# Patient Record
Sex: FEMALE | Race: WHITE | NOT HISPANIC OR LATINO
[De-identification: names, ages, dates, MRNs, and addresses within clinical notes are randomized per-mention and may not be internally consistent; named-entity substitution may affect disease eponyms.]

---

## 2017-02-17 ENCOUNTER — TRANSCRIPTION ENCOUNTER (OUTPATIENT)
Age: 37
End: 2017-02-17

## 2017-03-29 ENCOUNTER — TRANSCRIPTION ENCOUNTER (OUTPATIENT)
Age: 37
End: 2017-03-29

## 2017-04-15 ENCOUNTER — TRANSCRIPTION ENCOUNTER (OUTPATIENT)
Age: 37
End: 2017-04-15

## 2017-09-18 ENCOUNTER — TRANSCRIPTION ENCOUNTER (OUTPATIENT)
Age: 37
End: 2017-09-18

## 2018-02-26 ENCOUNTER — TRANSCRIPTION ENCOUNTER (OUTPATIENT)
Age: 38
End: 2018-02-26

## 2021-11-22 ENCOUNTER — EMERGENCY (EMERGENCY)
Facility: HOSPITAL | Age: 41
LOS: 1 days | Discharge: HOME | End: 2021-11-22
Attending: EMERGENCY MEDICINE | Admitting: EMERGENCY MEDICINE
Payer: COMMERCIAL

## 2021-11-22 VITALS
TEMPERATURE: 99 F | RESPIRATION RATE: 17 BRPM | SYSTOLIC BLOOD PRESSURE: 110 MMHG | HEART RATE: 85 BPM | OXYGEN SATURATION: 98 % | DIASTOLIC BLOOD PRESSURE: 62 MMHG

## 2021-11-22 VITALS
DIASTOLIC BLOOD PRESSURE: 73 MMHG | SYSTOLIC BLOOD PRESSURE: 119 MMHG | TEMPERATURE: 98 F | HEIGHT: 64 IN | OXYGEN SATURATION: 98 % | HEART RATE: 91 BPM | WEIGHT: 119.93 LBS | RESPIRATION RATE: 18 BRPM

## 2021-11-22 PROCEDURE — 72125 CT NECK SPINE W/O DYE: CPT | Mod: ME

## 2021-11-22 PROCEDURE — G1004: CPT

## 2021-11-22 PROCEDURE — 99284 EMERGENCY DEPT VISIT MOD MDM: CPT | Mod: 25

## 2021-11-22 PROCEDURE — 72125 CT NECK SPINE W/O DYE: CPT | Mod: 26,ME

## 2021-11-22 PROCEDURE — 99284 EMERGENCY DEPT VISIT MOD MDM: CPT

## 2021-11-22 RX ORDER — CYCLOBENZAPRINE HYDROCHLORIDE 10 MG/1
10 TABLET, FILM COATED ORAL ONCE
Refills: 0 | Status: COMPLETED | OUTPATIENT
Start: 2021-11-22 | End: 2021-11-22

## 2021-11-22 RX ORDER — OXYCODONE AND ACETAMINOPHEN 5; 325 MG/1; MG/1
1 TABLET ORAL
Qty: 20 | Refills: 0
Start: 2021-11-22

## 2021-11-22 RX ORDER — CYCLOBENZAPRINE HYDROCHLORIDE 10 MG/1
1 TABLET, FILM COATED ORAL
Qty: 10 | Refills: 0
Start: 2021-11-22

## 2021-11-22 RX ORDER — OXYCODONE AND ACETAMINOPHEN 5; 325 MG/1; MG/1
1 TABLET ORAL ONCE
Refills: 0 | Status: DISCONTINUED | OUTPATIENT
Start: 2021-11-22 | End: 2021-11-22

## 2021-11-22 RX ADMIN — Medication 60 MILLIGRAM(S): at 10:38

## 2021-11-22 RX ADMIN — OXYCODONE AND ACETAMINOPHEN 1 TABLET(S): 5; 325 TABLET ORAL at 10:38

## 2021-11-22 RX ADMIN — OXYCODONE AND ACETAMINOPHEN 1 TABLET(S): 5; 325 TABLET ORAL at 11:25

## 2021-11-22 RX ADMIN — CYCLOBENZAPRINE HYDROCHLORIDE 10 MILLIGRAM(S): 10 TABLET, FILM COATED ORAL at 10:38

## 2021-11-22 NOTE — ED ADULT NURSE NOTE - OBJECTIVE STATEMENT
Patient states she woke up with neck pain about a week ago and today her right thumb is numb and she has intermittent burning pain to the distal portion of her right arm.

## 2021-11-22 NOTE — ED ADULT TRIAGE NOTE - CHIEF COMPLAINT QUOTE
Patient is a 40yo female complaining of right arm pain and burning and numbness times one week Pain started in neck and shoulder. Patient denies chest pain

## 2021-11-22 NOTE — ED PROVIDER NOTE - PHYSICAL EXAMINATION
dec sensation to R lat elbow, dorsal thumb. Nl rest of hand. Nl str equal bl in all isolated movements.   Nl cap refill, 2+ pulses. no edema to arm. Nl axilla. dec sensation to R lat elbow, dorsal thumb. Nl rest of hand. Nl str equal bl in all isolated movements except some slight weakness with extension of L thumb.   Nl cap refill, 2+ pulses. no edema to arm. Nl axilla.

## 2021-11-22 NOTE — CONSULT NOTE ADULT - SUBJECTIVE AND OBJECTIVE BOX
41F with history of back pain, migraine, who presents with right forearm pain and tingling. Patient has long history of low back pain but about a week ago woke up with a pinched nerve on the right side in her neck. About two days ago, patient asked  to crack her back; immediately after, she felt her shoulder and neck pain resolve but felt new pain in her forearm and numbness of the thumb. The pain became so unbearable that she came to the hospital today.    In the ED, patient notes her pain is 10/10 and localized through her forearm. She notes it is burning, stabbing, and shocking. She has numbness over the thumb that she rates as 50% of her normal. Nothing has made the pain better and it comes in waves. Patient denies bowel or bladder symptoms, leg pain, or inability to walk. Patient denies any other numbness, tingling, weakness, or any other orthopaedic complaint.     Exam:   T(C): 36.8 (11-22-21 @ 10:14), Max: 36.8 (11-22-21 @ 10:14)  T(F): 98.2 (11-22-21 @ 10:14), Max: 98.2 (11-22-21 @ 10:14)  HR: 91 (11-22-21 @ 10:14) (91 - 91)  BP: 119/73 (11-22-21 @ 10:14) (119/73 - 119/73)  RR: 18 (11-22-21 @ 10:14) (18 - 18)  SpO2: 98% (11-22-21 @ 10:14) (98% - 98%)    Gen: in pain, in bed.   Spine:  Skin intact. No edema, erythema, or lesions of the skin. No visualized deformity.   TTP in the paraspinal areas in the C-T spine regions with paraspinal muscle stiffness. TTP over the lower lumbar spine in the midline, consistent with patient's baseline.   DP, radial pulses palpable.  No calf tenderness bilaterally.  Compartments soft and compressible.     Motor:                   C5                C6              C7               C8           T1   R            4+/5             4+/5            4+/5            4+/5        4+/5                  L             5/5               5/5             5/5             5/5          5/5                L2             L3             L4               L5            S1  R         5/5           5/5          5/5             5/5           5/5  L          5/5          5/5           5/5             5/5           5/5    Sensory:            C5         C6         C7      C8       T1        (0=absent, 1=impaired, 2=normal, NT=not testable)  R         2            1           2        2         2  L          2            2           2        2         2               L2          L3         L4      L5       S1         (0=absent, 1=impaired, 2=normal, NT=not testable)  R         2            2            2        2        2  L          2            2           2        2         2      Laboratory Results:       Imaging: CT of the cervical spine demonstrating disc bulge at C5-6

## 2021-11-22 NOTE — CONSULT NOTE ADULT - ASSESSMENT
41F with C5-6 disc herniation.     Plan:   WBAT/PT/OT  Pain management PRN  Imaging findings reviewed and discussed with the patient and her .   Steroids, flexeril, pain medication  No acute orthopaedic surgical intervention indicated at this time. This patient is orthopaedically stable for discharge.   Patient to follow up with Dr. Martinez as an outpatient for further evaluation and management.   All of the patient's questions and concerns were answered and addressed.   Will discuss with Dr. Martinez and will advise for any changes to the plan.    41F with C5-6 disc herniation.     Plan:   WBAT/PT/OT  Pain management PRN  Imaging findings reviewed and discussed with the patient and her .   Give flexeril, steroids, pain medication now; discharge with medrol dosepack  No acute orthopaedic surgical intervention indicated at this time. This patient is orthopaedically stable for discharge.   Patient to follow up with Dr. Martinez as an outpatient for further evaluation and management.   All of the patient's questions and concerns were answered and addressed.   Discussed with Dr. Martinez who agrees with the plan.

## 2021-11-22 NOTE — ED ADULT NURSE NOTE - CHIEF COMPLAINT QUOTE
Patient is a 42yo female complaining of right arm pain and burning and numbness times one week Pain started in neck and shoulder. Patient denies chest pain

## 2021-11-22 NOTE — ED PROVIDER NOTE - CARE PROVIDER_API CALL
Damon Martinez (MD)  Orthopaedic Surgery  [facility], [address] [place] [address]  Phone: [phone]  Fax: [phone]  Follow Up Time:     Lina Lee ()  Internal Medicine  [address] [place] [address]  Phone: [phone]  Fax: [phone]  Follow Up Time:

## 2021-11-22 NOTE — ED PROVIDER NOTE - NSFOLLOWUPINSTRUCTIONS_ED_ALL_ED_FT
1) Follow-up with your Primary Medical Doctor or referred doctor. Call today / next business day for prompt follow-up.  2) With neck pain, whether it is a new pain or a chronic pain, it is very important to have close, prompt outpatient follow-up for continued workup, evaluation and definitive diagnosis.  If you develop worsening or persistent pain, any numbness, tingling, weakness of one or both of your arms or legs, any fever, or any changes / difficulty urinating then you will need to see your back doctor immediately or you can return to the emergency room.  Many times your doctor will need to set you up for further imaging / testing such as an MRI.  3) See attached instruction sheets for additional information, including information regarding signs and symptoms to look out for, reasons to seek immediate care and other important instructions.  4) Follow-up with your Orthopedist or Neurosurgeon as soon as possible. If you do not have one then you will need to call the referred doctor as soon as possible (today / next business day) for prompt follow-up for further workup and treatment. See the referred doctor for information.  5) Medrol dose pack as prescribed  6) Flexeril as needed for muscle spasm , no driving  7) PErcocet as needed for pain, no driving

## 2021-11-22 NOTE — ED PROVIDER NOTE - CARE PROVIDERS DIRECT ADDRESSES
,[email or web address]_Unknown,[email or web address]

## 2021-11-22 NOTE — ED PROVIDER NOTE - PATIENT PORTAL LINK FT
You can access the FollowMyHealth Patient Portal offered by [facility] by registering at the following website: [email or web address]. By joining [employer]’s FollowMyHealth portal, you will also be able to view your health information using other applications (apps) compatible with our system.

## 2021-11-22 NOTE — ED PROVIDER NOTE - OBJECTIVE STATEMENT
40 yo F p/w woke up with neck pain on R side of neck 3 days ago. Pt had her  "crack her neck / back" and afterwards, having less pain in neck, but pain with slight numbness / tinging in R arm. No fever/chills. no fall/ direct trauma. no fever/chills. no HA/n/v/dizzy. no visual changes. no cough / uri. pt fully vaccinated for covid, no recent exposures. No focal weakness. no agg/allev factors. no other acute co.

## 2021-11-22 NOTE — ED PROVIDER NOTE - PROGRESS NOTE DETAILS
Pt seen by Ortho res, in conj with Dr Martinez- NO MRI needed at this time, can dc home with medrol, outpt fu with Juan for further mri / further mckeon / rx.  Pt wishes to go home now, moderate improvement.   Discussed with patient about the nature of the neck pain and the importance of outpatient follow-up for continued workup, evaluation and definitive diagnosis.  Discussed neck pain and neurologic precautions including numbness, tingling, weakness, fever, and changes in bowel/bladder.  An opportunity to ask questions was given . Understanding of all instructions and precautions was verbalized and the patient will follow-up as outpatient as soon as possible. Patient also understands the possibility of needing additional imaging, ie MRI as outpatient. Patient will return with any changes, concerns, or any worsening / persistent symptoms.

## 2022-02-17 ENCOUNTER — TRANSCRIPTION ENCOUNTER (OUTPATIENT)
Age: 42
End: 2022-02-17

## 2022-03-08 ENCOUNTER — TRANSCRIPTION ENCOUNTER (OUTPATIENT)
Age: 42
End: 2022-03-08

## 2022-05-02 ENCOUNTER — EMERGENCY (EMERGENCY)
Facility: HOSPITAL | Age: 42
LOS: 1 days | Discharge: HOME | End: 2022-05-02
Attending: STUDENT IN AN ORGANIZED HEALTH CARE EDUCATION/TRAINING PROGRAM | Admitting: STUDENT IN AN ORGANIZED HEALTH CARE EDUCATION/TRAINING PROGRAM
Payer: COMMERCIAL

## 2022-05-02 VITALS
HEIGHT: 64 IN | DIASTOLIC BLOOD PRESSURE: 84 MMHG | RESPIRATION RATE: 20 BRPM | HEART RATE: 110 BPM | SYSTOLIC BLOOD PRESSURE: 131 MMHG | OXYGEN SATURATION: 97 % | TEMPERATURE: 98 F

## 2022-05-02 PROCEDURE — 72125 CT NECK SPINE W/O DYE: CPT | Mod: 26,MA

## 2022-05-02 PROCEDURE — 99284 EMERGENCY DEPT VISIT MOD MDM: CPT

## 2022-05-02 PROCEDURE — 99284 EMERGENCY DEPT VISIT MOD MDM: CPT | Mod: 25

## 2022-05-02 PROCEDURE — 72125 CT NECK SPINE W/O DYE: CPT | Mod: MA

## 2022-05-02 RX ORDER — OXYCODONE AND ACETAMINOPHEN 5; 325 MG/1; MG/1
1 TABLET ORAL ONCE
Refills: 0 | Status: DISCONTINUED | OUTPATIENT
Start: 2022-05-02 | End: 2022-05-02

## 2022-05-02 RX ORDER — GABAPENTIN 400 MG/1
1 CAPSULE ORAL
Qty: 21 | Refills: 0
Start: 2022-05-02 | End: 2022-05-08

## 2022-05-02 RX ORDER — GABAPENTIN 400 MG/1
300 CAPSULE ORAL ONCE
Refills: 0 | Status: COMPLETED | OUTPATIENT
Start: 2022-05-02 | End: 2022-05-02

## 2022-05-02 RX ORDER — LIDOCAINE 4 G/100G
1 CREAM TOPICAL ONCE
Refills: 0 | Status: COMPLETED | OUTPATIENT
Start: 2022-05-02 | End: 2022-05-02

## 2022-05-02 RX ORDER — GABAPENTIN 400 MG/1
1 CAPSULE ORAL
Qty: 42 | Refills: 0
Start: 2022-05-02 | End: 2022-05-15

## 2022-05-02 RX ADMIN — GABAPENTIN 300 MILLIGRAM(S): 400 CAPSULE ORAL at 15:51

## 2022-05-02 RX ADMIN — OXYCODONE AND ACETAMINOPHEN 1 TABLET(S): 5; 325 TABLET ORAL at 15:51

## 2022-05-02 RX ADMIN — LIDOCAINE 1 PATCH: 4 CREAM TOPICAL at 15:51

## 2022-05-02 NOTE — ED PROVIDER NOTE - NS ED ROS FT
Constitutional: No reported recent fever.  Neurological: No reported acute headache.  Eyes: No reported new vision changes.   Ears, Nose, Mouth, Throat: No reported acute sore throat.  Cardiovascular: No reported current chest pain.  Respiratory: No reported new shortness of breath.  Gastrointestinal: No reported vomiting.  Genitourinary: No reported new urinary problems.  Musculoskeletal: + extremity pain.  Integumentary (skin and/or breast): No reported new rash.

## 2022-05-02 NOTE — ED PROVIDER NOTE - PATIENT PORTAL LINK FT
You can access the FollowMyHealth Patient Portal offered by [facility] by registering at the following website: [email or web address]. By joining [employer]’s FollowMyHealth portal, you will also be able to view your health information using other applications (apps) compatible with our system.

## 2022-05-02 NOTE — ED ADULT TRIAGE NOTE - CHIEF COMPLAINT QUOTE
c/o b/l arm/hand pain worse on right arm /hand  started few weeks ago ,off and on and got worse for the past 3 days

## 2022-05-02 NOTE — ED PROVIDER NOTE - CLINICAL SUMMARY MEDICAL DECISION MAKING FREE TEXT BOX
Here with intermittent arm pain and weakness, likely from neck pathology. will treat pain and get CT of neck.

## 2022-05-02 NOTE — ED PROVIDER NOTE - PHYSICAL EXAMINATION
in prog Vital signs as available reviewed.  General:  No acute distress.  Head:  Normocephalic, atraumatic.  Eyes:  Conjunctiva pink, no icterus.  Cardiovascular:  Regular rate, no obvious murmur.  Respiratory:  Clear to auscultation, good air entry bilaterally.  Abdomen:  Soft, non-tender.  Musculoskeletal: sensation intact. patient with decreased  strength in right hand. + spurling test.   Neurologic: Alert and oriented, moving all extremities.  Skin:  Warm and dry.

## 2022-05-02 NOTE — ED ADULT NURSE NOTE - OBJECTIVE STATEMENT
Pt received in bed alert and oriented and resting in bed with the c.o b/l arm/hand pain worse on right arm /hand  started few weeks ago since she has been moving from one house to another. Pt states that it is on and off, and now it has gotten worse for the past 3 days. As per MD's orders meds given and tolerated well. Nursing care ongoing safety maintained.

## 2022-05-02 NOTE — ED PROVIDER NOTE - CARE PROVIDER_API CALL
Damon Martinez)  Orthopaedic Surgery  [address] [place] [address], [address] [place] [address]  Phone: [phone]  Fax: [phone]  Follow Up Time: 1-3 Days

## 2022-05-02 NOTE — ED PROVIDER NOTE - PROGRESS NOTE DETAILS
[place], patient with multiple short term prescriptions for oxycodone from Dr. Jenae Santoyo including 4/29 for 10 days. 4/19 x 10 days 4/10 x 10 days. [place]  cons[place]ed, patient with multiple short term prescriptions for oxycodone from Dr. Jenae Santoyo including 4/29 for 10 days. 4/19 x 10 days 4/10 x 10 days etc.

## 2022-05-02 NOTE — ED ADULT NURSE NOTE - CAS ELECT INFOMATION PROVIDED
DC instructions Terbinafine Pregnancy And Lactation Text: This medication is Pregnancy Category B and is considered safe during pregnancy. It is also excreted in breast milk and breast feeding isn't recommended.

## 2022-05-02 NOTE — ED PROVIDER NOTE - OBJECTIVE STATEMENT
42 F history of herniated discs, here with fiance complaining of arm pain. pain has been intermittent for the past few month at least. no new trauma or injury. patient not taking anything for pain at home. patient reports pain causes her to be unable to move hand. has seen an orthopedist in the past but does not remember their name. no PMD.

## 2022-05-02 NOTE — ED PROVIDER NOTE - NSFOLLOWUPINSTRUCTIONS_ED_ALL_ED_FT
Please follow up at the clinic below and with the orthopedist listed below.  Please take your medications as prescribed.    Cervical Radiculopathy       Cervical radiculopathy means that a nerve in the neck (a cervical nerve) is pinched or bruised. This can happen because of an injury to the cervical spine (vertebrae) in the neck, or as a normal part of getting older. This can cause pain or loss of feeling (numbness) that runs from your neck all the way down to your arm and fingers. Often, this condition gets better with rest. Treatment may be needed if the condition does not get better.      What are the causes?    •A neck injury.      •A bulging disk in your spine.      •Muscle movements that you cannot control (muscle spasms).      •Tight muscles in your neck due to overuse.      •Arthritis.      •Breakdown in the bones and joints of the spine (spondylosis) due to getting older.      •Bone spurs that form near the nerves in the neck.        What are the signs or symptoms?  •Pain. The pain may:  •Run from the neck to the arm and hand.      •Be very bad or irritating.      •Be worse when you move your neck.        •Loss of feeling or tingling in your arm or hand.      •Weakness in your arm or hand, in very bad cases.        How is this treated?    In many cases, treatment is not needed for this condition. With rest, the condition often gets better over time. If treatment is needed, options may include:  •Wearing a soft neck collar (cervical collar) for short periods of time, as told by your doctor.      •Doing exercises (physical therapy) to strengthen your neck muscles.      •Taking medicines.      •Having shots (injections) in your spine, in very bad cases.      •Having surgery. This may be needed if other treatments do not help. The type of surgery that is used depends on the cause of your condition.        Follow these instructions at home:    If you have a soft neck collar:     •Wear it as told by your doctor. Remove it only as told by your doctor.    •Ask your doctor if you can remove the collar for cleaning and bathing. If you are allowed to remove the collar for cleaning or bathing:  •Follow instructions from your doctor about how to remove the collar safely.      •Clean the collar by wiping it with mild soap and water and drying it completely.      •Take out any removable pads in the collar every 1–2 days. Wash them by hand with soap and water. Let them air-dry completely before you put them back in the collar.      •Check your skin under the collar for redness or sores. If you see any, tell your doctor.          Managing pain                   •Take over-the-counter and prescription medicines only as told by your doctor.    •If told, put ice on the painful area.  •If you have a soft neck collar, remove it as told by your doctor.      •Put ice in a plastic bag.      •Place a towel between your skin and the bag.      •Leave the ice on for 20 minutes, 2–3 times a day.      •If using ice does not help, you can try using heat. Use the heat source that your doctor recommends, such as a moist heat pack or a heating pad.  •Place a towel between your skin and the heat source.      •Leave the heat on for 20–30 minutes.      •Remove the heat if your skin turns bright red. This is very important if you are unable to feel pain, heat, or cold. You may have a greater risk of getting burned.        •You may try a gentle neck and shoulder rub (massage).      Activity     •Rest as needed.      •Return to your normal activities as told by your doctor. Ask your doctor what activities are safe for you.      •Do exercises as told by your doctor or physical therapist.      • Do not lift anything that is heavier than 10 lb (4.5 kg) until your doctor tells you that it is safe.      General instructions     •Use a flat pillow when you sleep.      • Do not drive while wearing a soft neck collar. If you do not have a soft neck collar, ask your doctor if it is safe to drive while your neck heals.      •Ask your doctor if the medicine prescribed to you requires you to avoid driving or using heavy machinery.      • Do not use any products that contain nicotine or tobacco, such as cigarettes, e-cigarettes, and chewing tobacco. These can delay healing. If you need help quitting, ask your doctor.      •Keep all follow-up visits as told by your doctor. This is important.        Contact a doctor if:    •Your condition does not get better with treatment.        Get help right away if:    •Your pain gets worse and is not helped with medicine.      •You lose feeling or feel weak in your hand, arm, face, or leg.      •You have a high fever.      •You have a stiff neck.      •You cannot control when you poop or pee (have incontinence).      •You have trouble with walking, balance, or talking.        Summary    •Cervical radiculopathy means that a nerve in the neck is pinched or bruised.      •A nerve can get pinched from a bulging disk, arthritis, an injury to the neck, or other causes.      •Symptoms include pain, tingling, or loss of feeling that goes from the neck into the arm or hand.      •Weakness in your arm or hand can happen in very bad cases.      •Treatment may include resting, wearing a soft neck collar, and doing exercises. You might need to take medicines for pain. In very bad cases, shots or surgery may be needed.      This information is not intended to replace advice given to you by your health care provider. Make sure you discuss any questions you have with your health care provider. Your CT scan showed some disc bulging, which was seen before.   Please follow up at the clinic below and with the orthopedist listed below.  Please take your medications as prescribed.    Cervical Radiculopathy       Cervical radiculopathy means that a nerve in the neck (a cervical nerve) is pinched or bruised. This can happen because of an injury to the cervical spine (vertebrae) in the neck, or as a normal part of getting older. This can cause pain or loss of feeling (numbness) that runs from your neck all the way down to your arm and fingers. Often, this condition gets better with rest. Treatment may be needed if the condition does not get better.      What are the causes?    •A neck injury.      •A bulging disk in your spine.      •Muscle movements that you cannot control (muscle spasms).      •Tight muscles in your neck due to overuse.      •Arthritis.      •Breakdown in the bones and joints of the spine (spondylosis) due to getting older.      •Bone spurs that form near the nerves in the neck.        What are the signs or symptoms?  •Pain. The pain may:  •Run from the neck to the arm and hand.      •Be very bad or irritating.      •Be worse when you move your neck.        •Loss of feeling or tingling in your arm or hand.      •Weakness in your arm or hand, in very bad cases.        How is this treated?    In many cases, treatment is not needed for this condition. With rest, the condition often gets better over time. If treatment is needed, options may include:  •Wearing a soft neck collar (cervical collar) for short periods of time, as told by your doctor.      •Doing exercises (physical therapy) to strengthen your neck muscles.      •Taking medicines.      •Having shots (injections) in your spine, in very bad cases.      •Having surgery. This may be needed if other treatments do not help. The type of surgery that is used depends on the cause of your condition.        Follow these instructions at home:    If you have a soft neck collar:     •Wear it as told by your doctor. Remove it only as told by your doctor.    •Ask your doctor if you can remove the collar for cleaning and bathing. If you are allowed to remove the collar for cleaning or bathing:  •Follow instructions from your doctor about how to remove the collar safely.      •Clean the collar by wiping it with mild soap and water and drying it completely.      •Take out any removable pads in the collar every 1–2 days. Wash them by hand with soap and water. Let them air-dry completely before you put them back in the collar.      •Check your skin under the collar for redness or sores. If you see any, tell your doctor.          Managing pain                   •Take over-the-counter and prescription medicines only as told by your doctor.    •If told, put ice on the painful area.  •If you have a soft neck collar, remove it as told by your doctor.      •Put ice in a plastic bag.      •Place a towel between your skin and the bag.      •Leave the ice on for 20 minutes, 2–3 times a day.      •If using ice does not help, you can try using heat. Use the heat source that your doctor recommends, such as a moist heat pack or a heating pad.  •Place a towel between your skin and the heat source.      •Leave the heat on for 20–30 minutes.      •Remove the heat if your skin turns bright red. This is very important if you are unable to feel pain, heat, or cold. You may have a greater risk of getting burned.        •You may try a gentle neck and shoulder rub (massage).      Activity     •Rest as needed.      •Return to your normal activities as told by your doctor. Ask your doctor what activities are safe for you.      •Do exercises as told by your doctor or physical therapist.      • Do not lift anything that is heavier than 10 lb (4.5 kg) until your doctor tells you that it is safe.      General instructions     •Use a flat pillow when you sleep.      • Do not drive while wearing a soft neck collar. If you do not have a soft neck collar, ask your doctor if it is safe to drive while your neck heals.      •Ask your doctor if the medicine prescribed to you requires you to avoid driving or using heavy machinery.      • Do not use any products that contain nicotine or tobacco, such as cigarettes, e-cigarettes, and chewing tobacco. These can delay healing. If you need help quitting, ask your doctor.      •Keep all follow-up visits as told by your doctor. This is important.        Contact a doctor if:    •Your condition does not get better with treatment.        Get help right away if:    •Your pain gets worse and is not helped with medicine.      •You lose feeling or feel weak in your hand, arm, face, or leg.      •You have a high fever.      •You have a stiff neck.      •You cannot control when you poop or pee (have incontinence).      •You have trouble with walking, balance, or talking.        Summary    •Cervical radiculopathy means that a nerve in the neck is pinched or bruised.      •A nerve can get pinched from a bulging disk, arthritis, an injury to the neck, or other causes.      •Symptoms include pain, tingling, or loss of feeling that goes from the neck into the arm or hand.      •Weakness in your arm or hand can happen in very bad cases.      •Treatment may include resting, wearing a soft neck collar, and doing exercises. You might need to take medicines for pain. In very bad cases, shots or surgery may be needed.      This information is not intended to replace advice given to you by your health care provider. Make sure you discuss any questions you have with your health care provider.

## 2023-07-26 ENCOUNTER — APPOINTMENT (OUTPATIENT)
Dept: ORTHOPEDIC SURGERY | Facility: CLINIC | Age: 43
End: 2023-07-26
Payer: COMMERCIAL

## 2023-07-26 VITALS — WEIGHT: 125 LBS | HEIGHT: 63 IN | BODY MASS INDEX: 22.15 KG/M2

## 2023-07-26 DIAGNOSIS — M54.12 RADICULOPATHY, CERVICAL REGION: ICD-10-CM

## 2023-07-26 PROCEDURE — 99203 OFFICE O/P NEW LOW 30 MIN: CPT

## 2023-07-26 PROCEDURE — 99213 OFFICE O/P EST LOW 20 MIN: CPT

## 2023-07-26 PROCEDURE — 73110 X-RAY EXAM OF WRIST: CPT | Mod: RT

## 2023-07-26 RX ORDER — GABAPENTIN 100 MG/1
100 CAPSULE ORAL 3 TIMES DAILY
Qty: 90 | Refills: 0 | Status: ACTIVE | COMMUNITY
Start: 2023-07-26 | End: 1900-01-01

## 2023-07-27 ENCOUNTER — APPOINTMENT (OUTPATIENT)
Dept: MRI IMAGING | Facility: CLINIC | Age: 43
End: 2023-07-27
Payer: COMMERCIAL

## 2023-07-27 PROCEDURE — 72141 MRI NECK SPINE W/O DYE: CPT

## 2023-08-02 ENCOUNTER — APPOINTMENT (OUTPATIENT)
Dept: NEUROLOGY | Facility: CLINIC | Age: 43
End: 2023-08-02
Payer: COMMERCIAL

## 2023-08-02 PROCEDURE — 95886 MUSC TEST DONE W/N TEST COMP: CPT | Mod: 26

## 2023-08-02 PROCEDURE — 95911 NRV CNDJ TEST 9-10 STUDIES: CPT | Mod: 26

## 2023-08-09 ENCOUNTER — APPOINTMENT (OUTPATIENT)
Dept: ORTHOPEDIC SURGERY | Facility: CLINIC | Age: 43
End: 2023-08-09
Payer: COMMERCIAL

## 2023-08-09 DIAGNOSIS — M21.331 WRIST DROP, RIGHT WRIST: ICD-10-CM

## 2023-08-09 PROCEDURE — 99213 OFFICE O/P EST LOW 20 MIN: CPT

## 2023-08-09 RX ORDER — GABAPENTIN 300 MG/1
300 CAPSULE ORAL
Qty: 30 | Refills: 0 | Status: ACTIVE | COMMUNITY
Start: 2023-08-09 | End: 1900-01-01

## 2023-08-09 NOTE — PHYSICAL EXAM
[Rotation to right] : rotation to right [] : light touch intact throughout both upper extremities [de-identified] : 0/5 left wrist extension stregthen, wrist drop - 5/5 remainder

## 2023-08-09 NOTE — DISCUSSION/SUMMARY
[de-identified] : Reviewed the nature of these findings with the patient in layman's terms Briefly discussed potential treatment options MRI cervical spine NCS/ EMG Continue wrist brace Patient instructed on finger and wrist range of motion exercises Gabapentin prescribed for nighttime, patient cautioned on potential for drowsiness Follow-up after studies

## 2023-08-09 NOTE — DISCUSSION/SUMMARY
[de-identified] : Reviewed the nature of these findings with the patient in layman's terms Discussed potential treatment options Continue wrist brace Patient instructed on finger and wrist range of motion exercises Gabapentin prescribed for nighttime, patient cautioned on potential for drowsiness Follow-up in 1 month

## 2023-08-09 NOTE — DATA REVIEWED
[FreeTextEntry1] : MRI of the cervical spine reviewed. EMG: Moderate right carpal tunnel syndrome.  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

## 2023-08-09 NOTE — HISTORY OF PRESENT ILLNESS
[10] : 10 [9] : 9 [de-identified] : Patient returns for repeat evaluation.  She has an MRI and EMG available for review.  She continues to have mild sensory changes in the dorsum of the forearm.  07/26/2023- 43 year F is here for Location: Right wrist Complaint: Patient reports the sudden onset of a right wrist drop beginning on 7/16/2023. Unable to extend wrist with diminished sensation in the distal dorsal forearm. She also notes radiating pain in the dorsal forearm. She denies drug/alcohol use prior to the onset of these symptoms. Symptom onset: 07/16/23 Prior treatments: Brace Hand dominance: Right

## 2023-08-09 NOTE — IMAGING
[de-identified] : Right wrist Wrist drop noted No wounds + FDS/FDP No active thumb extension Diminished sensation in radial distribution fingers wwp

## 2023-08-09 NOTE — HISTORY OF PRESENT ILLNESS
[10] : 10 [9] : 9 [de-identified] : 07/26/2023- 43 year F is here for Location: Right wrist Complaint: Patient reports the sudden onset of a right wrist drop beginning on 7/16/2023.  Unable to extend wrist with diminished sensation in the distal dorsal forearm.  She also notes radiating pain in the dorsal forearm. She denies drug/alcohol use prior to the onset of these symptoms. Symptom onset: 07/16/23  Prior treatments: Brace Hand dominance: Right

## 2023-09-20 ENCOUNTER — APPOINTMENT (OUTPATIENT)
Dept: ORTHOPEDIC SURGERY | Facility: CLINIC | Age: 43
End: 2023-09-20